# Patient Record
Sex: MALE | Race: WHITE | NOT HISPANIC OR LATINO | Employment: FULL TIME | URBAN - METROPOLITAN AREA
[De-identification: names, ages, dates, MRNs, and addresses within clinical notes are randomized per-mention and may not be internally consistent; named-entity substitution may affect disease eponyms.]

---

## 2018-10-11 RX ORDER — LOSARTAN POTASSIUM AND HYDROCHLOROTHIAZIDE 12.5; 1 MG/1; MG/1
1 TABLET ORAL DAILY
COMMUNITY

## 2018-10-11 RX ORDER — ASPIRIN 81 MG/1
81 TABLET ORAL DAILY
COMMUNITY

## 2018-10-11 RX ORDER — MULTIVITAMIN
1 CAPSULE ORAL DAILY
COMMUNITY

## 2018-10-11 RX ORDER — MELATONIN
1000 DAILY
COMMUNITY

## 2018-10-14 ENCOUNTER — ANESTHESIA EVENT (OUTPATIENT)
Dept: GASTROENTEROLOGY | Facility: HOSPITAL | Age: 63
End: 2018-10-14
Payer: COMMERCIAL

## 2018-10-15 ENCOUNTER — ANESTHESIA (OUTPATIENT)
Dept: GASTROENTEROLOGY | Facility: HOSPITAL | Age: 63
End: 2018-10-15
Payer: COMMERCIAL

## 2018-10-15 ENCOUNTER — HOSPITAL ENCOUNTER (OUTPATIENT)
Facility: HOSPITAL | Age: 63
Setting detail: OUTPATIENT SURGERY
Discharge: HOME | End: 2018-10-15
Attending: COLON & RECTAL SURGERY | Admitting: COLON & RECTAL SURGERY
Payer: COMMERCIAL

## 2018-10-15 VITALS
SYSTOLIC BLOOD PRESSURE: 116 MMHG | RESPIRATION RATE: 20 BRPM | HEART RATE: 66 BPM | BODY MASS INDEX: 41.68 KG/M2 | OXYGEN SATURATION: 93 % | WEIGHT: 275 LBS | TEMPERATURE: 99.3 F | HEIGHT: 68 IN | DIASTOLIC BLOOD PRESSURE: 57 MMHG

## 2018-10-15 DIAGNOSIS — Z12.11 ENCOUNTER FOR SCREENING FOR MALIGNANT NEOPLASM OF COLON: ICD-10-CM

## 2018-10-15 DIAGNOSIS — Z86.010 HISTORY OF COLONIC POLYPS: ICD-10-CM

## 2018-10-15 PROCEDURE — 88305 TISSUE EXAM BY PATHOLOGIST: CPT | Performed by: PATHOLOGY

## 2018-10-15 RX ORDER — SODIUM CHLORIDE 9 MG/ML
125 INJECTION, SOLUTION INTRAVENOUS CONTINUOUS
Status: DISCONTINUED | OUTPATIENT
Start: 2018-10-15 | End: 2018-10-15 | Stop reason: HOSPADM

## 2018-10-15 RX ORDER — PROPOFOL 10 MG/ML
INJECTION, EMULSION INTRAVENOUS AS NEEDED
Status: DISCONTINUED | OUTPATIENT
Start: 2018-10-15 | End: 2018-10-15 | Stop reason: SURG

## 2018-10-15 RX ORDER — ONDANSETRON 2 MG/ML
4 INJECTION INTRAMUSCULAR; INTRAVENOUS EVERY 6 HOURS PRN
Status: DISCONTINUED | OUTPATIENT
Start: 2018-10-15 | End: 2018-10-15 | Stop reason: HOSPADM

## 2018-10-15 RX ORDER — PROPOFOL 10 MG/ML
INJECTION, EMULSION INTRAVENOUS CONTINUOUS PRN
Status: DISCONTINUED | OUTPATIENT
Start: 2018-10-15 | End: 2018-10-15 | Stop reason: SURG

## 2018-10-15 RX ORDER — NIACIN 100 MG
100 TABLET ORAL
COMMUNITY

## 2018-10-15 RX ORDER — CHLORAL HYDRATE 500 MG
1000 CAPSULE ORAL DAILY
COMMUNITY

## 2018-10-15 RX ORDER — RIBOFLAVIN (VITAMIN B2) 100 MG
100 TABLET ORAL DAILY
COMMUNITY

## 2018-10-15 RX ADMIN — SODIUM CHLORIDE 125 ML/HR: 0.9 INJECTION, SOLUTION INTRAVENOUS at 09:28

## 2018-10-15 RX ADMIN — PROPOFOL 120 MCG/KG/MIN: 10 INJECTION, EMULSION INTRAVENOUS at 10:01

## 2018-10-15 RX ADMIN — PROPOFOL 100 MG: 10 INJECTION, EMULSION INTRAVENOUS at 10:00

## 2018-10-15 NOTE — DISCHARGE INSTRUCTIONS
[facility] [address], [address] [place] [address] [place]  Phone: [phone]    DISCHARGE INSTRUCTIONS:    1   ___ Complete Exam - Normal    2   ___ Exam normal, but entire colon not seen  We will discuss this with you  3   ___ Polyp(s) removed by "burning" - NO pathology report will follow    4   _2__ Polyp(s) removed by excision  Pathology report will be available in 4-5 days   Someone from our office will call you with results  5   ___ Exam prompted biopsies  Pathology report will be available in 4-5 days   Someone from our office will call you with results  6   ___ Exam demonstrated findings that need treatment  Prescriptions will be   Given to you  Return to our office in ____ weeks  Please call for appt  7   ___ Original office visit or colonoscopy findings necessitate an office visit  Please call to set up a new appointment    8   ___ Medication  __________________________________________        [address]:    - Go straight home and rest today    - No driving or drinking alcohol for 24 hours    - Resume regular diet and medications unless otherwise instructed  Coumadin and Plavix are blood thinners  You can resume these medications on __________  IF YOU ARE HAVING ANY FEVER, BLEEDING OR PERSISTENT PAIN IN THE ABDOMEN, PLEASE CALL OUR OFFICE ANY DAY OR TIME  [phone]        Colorectal Polyps   WHAT YOU NEED TO KNOW:   Colorectal polyps are small growths of tissue in the lining of the colon and rectum  Most polyps are hyperplastic polyps and are usually benign (noncancerous)  Certain types of polyps, called adenomatous polyps, may turn into cancer  DISCHARGE INSTRUCTIONS:   Follow up with your healthcare provider or gastroenterologist as directed: You may need to return for more tests, such as another colonoscopy  Write down your questions so you remember to ask them during your visits    Reduce your risk for colorectal polyps:   · Eat a variety of healthy foods:  Healthy foods include fruit, vegetables, whole-grain breads, low-fat dairy products, beans, lean meat, and fish  Ask if you need to be on a special diet  · Maintain a healthy weight:  Ask your healthcare provider if you need to lose weight and how much you need to lose  Ask for help with a weight loss program     · Exercise:  Begin to exercise slowly and do more as you get stronger  Talk with your healthcare provider before you start an exercise program      · Limit alcohol:  Your risk for polyps increases the more you drink  · Do not smoke: If you smoke, it is never too late to quit  Ask for information about how to stop  For support and more information:   · Nasir Gar ([facility])  [address] [address]  Phone: [phone]  Web Address: www digestive  niddk nih gov  Contact your healthcare provider or gastroenterologist if:   · You have a fever  · You have chills, a cough, or feel weak and achy  · You have abdominal pain that does not go away or gets worse after you take medicine  · Your abdomen is swollen  · You are losing weight without trying  · You have questions or concerns about your condition or care  Seek care immediately or call 911 if:   · You have sudden shortness of breath  · You have a fast heart rate, fast breathing, or are too dizzy to stand up  · You have severe abdominal pain  · You see blood in your bowel movement  © 2017 [address] [employer] Information is for End User's use only and may not be sold, redistributed or otherwise used for commercial purposes  All illustrations and images included in CareNotes® are the copyrighted property of [employer]  or [employer]rient[employer]  The above information is an  only  It is not intended as medical advice for individual conditions or treatments   Talk to your doctor, nurse or pharmacist before following any medical regimen to see if it is safe and effective for you  Colonoscopy   WHAT YOU NEED TO KNOW:   A colonoscopy is a procedure to examine the inside of your colon (intestine) with a scope  Polyps or tissue growths may have been removed during your colonoscopy  It is normal to feel bloated and to have some abdominal discomfort  You should be passing gas  If you have hemorrhoids or you had polyps removed, you may have a small amount of bleeding  DISCHARGE INSTRUCTIONS:   Seek care immediately if:   · You have a large amount of bright red blood in your bowel movements  · Your abdomen is hard and firm and you have severe pain  · You have sudden trouble breathing  Contact your healthcare provider if:   · You develop a rash or hives  · You have a fever within 24 hours of your procedure  · You have not had a bowel movement for 3 days after your procedure  · You have questions or concerns about your condition or care  Activity:   · Do not lift, strain, or run  for 3 days after your procedure  · Rest after your procedure  You have been given medicine to relax you  Do not  drive or make important decisions until the day after your procedure  Return to your normal activity as directed  · Relieve gas and discomfort from bloating  by lying on your right side with a heating pad on your abdomen  You may need to take short walks to help the gas move out  Eat small meals until bloating is relieved  If you had polyps removed: For 7 days after your procedure:  · Do not  take aspirin  · Do not  go on long car rides  Help prevent constipation:   · Eat a variety of healthy foods  Healthy foods include fruit, vegetables, whole-grain breads, low-fat dairy products, beans, lean meat, and fish  Ask if you need to be on a special diet  Your healthcare provider may recommend that you eat high-fiber foods such as cooked beans   Fiber helps you have regular bowel movements  · Drink liquids as directed  Adults should drink between 9 and 13 eight-ounce cups of liquid every day  Ask what amount is best for you  For most people, good liquids to drink are water, juice, and milk  · Exercise as directed  Talk to your healthcare provider about the best exercise plan for you  Exercise can help prevent constipation, decrease your blood pressure and improve your health  Follow up with your healthcare provider as directed:  Write down your questions so you remember to ask them during your visits  © 2017 [address] [facility] Information is for End User's use only and may not be sold, redistributed or otherwise used for commercial purposes  All illustrations and images included in CareNotes® are the copyrighted property of A D A M [employer]  or Darrick Barrientos  The above information is an  only  It is not intended as medical advice for individual conditions or treatments  Talk to your doctor, nurse or pharmacist before following any medical regimen to see if it is safe and effective for you

## 2018-10-15 NOTE — OP NOTE
OPERATIVE REPORT  PATIENT NAME: Armen Lorenzo    :  1955  MRN: [id number]  Pt Location: [facility] 01    SURGERY DATE: 10/15/2018    Indications:  Previous history of colon polyps  Procedure:  Colonoscopy to cecum with snare polypectomies  Findings:  Colon polyps  Anesthesia Type: IV Sedation with Anesthesia    Complications: None      Procedure: The patient was in the left lateral position  Sedation was administered by anesthesia  Rectal exam was unremarkable  The scope was introduced and passed to the cecum  The bowel preparation was only fair  Distal to the cecum was a 1 cm polyp excised with hot snare  Scope was removed  The descending colon was another 5 mm polyp excised by cold snare  The scope was removed  Impression:  Status post polypectomies  Plan:  Follow up histologies  Repeat colonoscopy in 3 years  Specimen(s):  ID Type Source Tests Collected by Time Destination   1 : polyp Tissue Large Intestine, Right/Ascending Colon TISSUE EXAM Jim Hughes MD 10/15/2018 1021          Attestation: I was present for the entire procedure        Patient Disposition: PACU      SIGNATURE: Jim Hughes MD  DATE: October 15, 2018  TIME: 10:27 AM

## 2018-10-15 NOTE — DISCHARGE SUMMARY
[facility]                                                                                 DISCHARGE SUMMARY        PATIENT NAME: Maxine New    :  1955  MRN: [id number]  Pt Location: [facility]    DISCHARGE DATE: 10/15/2018    PROCEDURE: Procedure(s) (LRB):  COLONOSCOPY with polypectomy (N/A)    Anesthesia Type: IV Sedation with Anesthesia    Complications: None    Specimen(s):  ID Type Source Tests Collected by Time Destination   1 : polyp Tissue Large Intestine, Right/Ascending Colon TISSUE Tameka Gordon MD 10/15/2018 1021    2 : polyp Tissue Large Intestine, Left/Descending Colon TISSUE EXAM Halima Burgess MD 10/15/2018 [facility] COURSE: The patient was admitted for elective procedure  The procedure was uncomplicated and the the patient was discharged home post procedure          SIGNATURE: Halima Burgess MD  DATE: October 15, 2018  TIME: 10:28 AM

## 2018-10-15 NOTE — ANESTHESIA PREPROCEDURE EVALUATION
Review of Systems/Medical History  Patient summary reviewed  Chart reviewed  No history of anesthetic complications     Cardiovascular  Hypertension controlled,    Pulmonary  Smoker ex-smoker  , Sleep apnea CPAP,        GI/Hepatic    Bowel prep  Comment: HCP     Prostatic disorder, history of prostate cancer       Endo/Other    Obesity  morbid obesity   GYN  Negative gynecology ROS          Hematology  Negative hematology ROS      Musculoskeletal  Negative musculoskeletal ROS        Neurology  Negative neurology ROS      Psychology   Negative psychology ROS              Physical Exam    Airway    Mallampati score: III  TM Distance: >3 FB  Neck ROM: full     Dental   No notable dental hx     Cardiovascular  Rhythm: regular, Rate: normal, Cardiovascular exam normal    Pulmonary  Pulmonary exam normal Breath sounds clear to auscultation,     Other Findings        Anesthesia Plan  ASA Score- 3     Anesthesia Type- IV sedation with anesthesia with ASA Monitors  Additional Monitors:   Airway Plan:         Plan Factors-Patient not instructed to abstain from smoking on day of procedure       Induction- intravenous  Postoperative Plan-     Informed Consent- Anesthetic plan and risks discussed with patient and spouse  I personally reviewed this patient with the CRNA  Discussed and agreed on the Anesthesia Plan with the CRNA  Jessica Carmona

## 2019-09-13 ENCOUNTER — ANESTHESIA EVENT (OUTPATIENT)
Dept: GASTROENTEROLOGY | Facility: HOSPITAL | Age: 64
End: 2019-09-13

## 2019-09-13 RX ORDER — HYDROCORTISONE ACETATE 25 MG/1
25 SUPPOSITORY RECTAL
COMMUNITY

## 2019-09-13 RX ORDER — MESALAMINE 1000 MG/1
1000 SUPPOSITORY RECTAL
COMMUNITY

## 2019-09-16 ENCOUNTER — ANESTHESIA (OUTPATIENT)
Dept: GASTROENTEROLOGY | Facility: HOSPITAL | Age: 64
End: 2019-09-16

## 2019-09-16 ENCOUNTER — HOSPITAL ENCOUNTER (OUTPATIENT)
Dept: GASTROENTEROLOGY | Facility: HOSPITAL | Age: 64
Setting detail: OUTPATIENT SURGERY
Discharge: HOME | End: 2019-09-16
Attending: COLON & RECTAL SURGERY | Admitting: COLON & RECTAL SURGERY
Payer: COMMERCIAL

## 2019-09-16 VITALS
SYSTOLIC BLOOD PRESSURE: 101 MMHG | HEIGHT: 68 IN | BODY MASS INDEX: 41.68 KG/M2 | HEART RATE: 54 BPM | TEMPERATURE: 98.6 F | OXYGEN SATURATION: 96 % | WEIGHT: 275 LBS | DIASTOLIC BLOOD PRESSURE: 54 MMHG | RESPIRATION RATE: 16 BRPM

## 2019-09-16 DIAGNOSIS — K62.5 HEMORRHAGE OF ANUS AND RECTUM: ICD-10-CM

## 2019-09-16 DIAGNOSIS — K50.10 CROHN'S DISEASE OF LARGE INTESTINE WITHOUT COMPLICATION (HCC): ICD-10-CM

## 2019-09-16 PROCEDURE — 88305 TISSUE EXAM BY PATHOLOGIST: CPT | Performed by: PATHOLOGY

## 2019-09-16 RX ORDER — MESALAMINE 4 G/60ML
4 ENEMA RECTAL
Qty: 1800 ML | Refills: 0 | Status: SHIPPED | OUTPATIENT
Start: 2019-09-16 | End: 2019-10-16

## 2019-09-16 RX ORDER — PROPOFOL 10 MG/ML
INJECTION, EMULSION INTRAVENOUS AS NEEDED
Status: DISCONTINUED | OUTPATIENT
Start: 2019-09-16 | End: 2019-09-16 | Stop reason: SURG

## 2019-09-16 RX ORDER — PREDNISONE 10 MG/1
40 TABLET ORAL DAILY
Qty: 120 TABLET | Refills: 0 | Status: SHIPPED | OUTPATIENT
Start: 2019-09-16 | End: 2019-10-16

## 2019-09-16 RX ORDER — SODIUM CHLORIDE 9 MG/ML
125 INJECTION, SOLUTION INTRAVENOUS CONTINUOUS
Status: DISCONTINUED | OUTPATIENT
Start: 2019-09-16 | End: 2019-09-20 | Stop reason: HOSPADM

## 2019-09-16 RX ORDER — ONDANSETRON 2 MG/ML
4 INJECTION INTRAMUSCULAR; INTRAVENOUS EVERY 6 HOURS PRN
Status: DISCONTINUED | OUTPATIENT
Start: 2019-09-16 | End: 2019-09-20 | Stop reason: HOSPADM

## 2019-09-16 RX ADMIN — PROPOFOL 20 MG: 10 INJECTION, EMULSION INTRAVENOUS at 08:15

## 2019-09-16 RX ADMIN — PROPOFOL 30 MG: 10 INJECTION, EMULSION INTRAVENOUS at 08:09

## 2019-09-16 RX ADMIN — PROPOFOL 100 MG: 10 INJECTION, EMULSION INTRAVENOUS at 08:01

## 2019-09-16 RX ADMIN — PROPOFOL 20 MG: 10 INJECTION, EMULSION INTRAVENOUS at 08:13

## 2019-09-16 RX ADMIN — PROPOFOL 30 MG: 10 INJECTION, EMULSION INTRAVENOUS at 08:07

## 2019-09-16 RX ADMIN — PROPOFOL 40 MG: 10 INJECTION, EMULSION INTRAVENOUS at 08:03

## 2019-09-16 RX ADMIN — SODIUM CHLORIDE 125 ML/HR: 0.9 INJECTION, SOLUTION INTRAVENOUS at 07:49

## 2019-09-16 RX ADMIN — LIDOCAINE HYDROCHLORIDE 20 MG: 20 INJECTION, SOLUTION INTRAVENOUS at 08:01

## 2019-09-16 RX ADMIN — PROPOFOL 20 MG: 10 INJECTION, EMULSION INTRAVENOUS at 08:11

## 2019-09-16 RX ADMIN — PROPOFOL 30 MG: 10 INJECTION, EMULSION INTRAVENOUS at 08:05

## 2019-09-16 NOTE — ANESTHESIA PREPROCEDURE EVALUATION
Review of Systems/Medical History  Patient summary reviewed  Chart reviewed      Cardiovascular  Hypertension controlled,    Pulmonary  Sleep apnea CPAP,        GI/Hepatic      Comment: Ulcerative proctitis  HCP     Prostatic disorder, history of prostate cancer       Endo/Other    Obesity  morbid obesity   GYN       Hematology  Negative hematology ROS      Musculoskeletal  Negative musculoskeletal ROS        Neurology  Negative neurology ROS      Psychology   Negative psychology ROS              Physical Exam    Airway    Mallampati score: III  TM Distance: >3 FB  Neck ROM: full     Dental   No notable dental hx     Cardiovascular  Rhythm: regular, Rate: normal, Cardiovascular exam normal    Pulmonary  Pulmonary exam normal Breath sounds clear to auscultation,     Other Findings        Anesthesia Plan  ASA Score- 3     Anesthesia Type- IV sedation with anesthesia with ASA Monitors  Additional Monitors:   Airway Plan:         Plan Factors-Patient not instructed to abstain from smoking on day of procedure  Patient did not smoke on day of surgery  Induction- intravenous  Postoperative Plan-     Informed Consent- Anesthetic plan and risks discussed with patient  I personally reviewed this patient with the CRNA  Discussed and agreed on the Anesthesia Plan with the CRNA  Marianne Cohn

## 2019-09-16 NOTE — INTERVAL H&P NOTE
H&P reviewed  After examining the patient I find no changes in the patients condition since the H&P had been written      Vitals:    09/16/19 0737   BP: 140/66   Pulse: 61   Resp: 17   Temp: 98 6 °F (37 °C)   SpO2: 97%

## 2019-09-16 NOTE — ANESTHESIA POSTPROCEDURE EVALUATION
Post-Op Assessment Note    CV Status:  Stable    Pain management: adequate     Mental Status:  Alert and awake   Hydration Status:  Euvolemic   PONV Controlled:  Controlled   Airway Patency:  Patent   Post Op Vitals Reviewed: Yes      Staff: Anesthesiologist, CRNA           /51 (09/16/19 0822)    Temp      Pulse (!) 54 (09/16/19 0822)   Resp 16 (09/16/19 0822)    SpO2 97 % (09/16/19 6258)

## 2019-09-16 NOTE — DISCHARGE INSTRUCTIONS
[facility] [address], [address] [place] [address] [address]  Phone: [phone]    DISCHARGE INSTRUCTIONS:    1   _x__ Complete Exam - proctitis    2   ___ Exam normal, but entire colon not seen  We will discuss this with you  3   ___ Polyp(s) removed by "burning" - NO pathology report will follow    4   _1__ Polyp(s) removed by excision  Pathology report will be available in 4-5 days   Someone from our office will call you with results  5   __x_ Exam prompted biopsies  Pathology report will be available in 4-5 days   Someone from our office will call you with results  6   ___ Exam demonstrated findings that need treatment  Prescriptions will be   Given to you  Return to our office in ____ weeks  Please call for appt  7   ___ Original office visit or colonoscopy findings necessitate an office visit  Please call to set up a new appointment    8   ___ Medication  __________________________________________        [address]:    - Go straight home and rest today    - No driving or drinking alcohol for 24 hours    - Resume regular diet and medications unless otherwise instructed  Coumadin and Plavix are blood thinners  You can resume these medications on __________  IF YOU ARE HAVING ANY FEVER, BLEEDING OR PERSISTENT PAIN IN THE ABDOMEN, PLEASE CALL OUR OFFICE ANY DAY OR TIME  [phone]                          Colonoscopy   WHAT YOU NEED TO KNOW:   A colonoscopy is a procedure to examine the inside of your colon (intestine) with a scope  Polyps or tissue growths may have been removed during your colonoscopy  It is normal to feel bloated and to have some abdominal discomfort  You should be passing gas  If you have hemorrhoids or you had polyps removed, you may have a small amount of bleeding  DISCHARGE INSTRUCTIONS:   Seek care immediately if:   · You have a large amount of bright red blood in your bowel movements      · Your abdomen is hard and firm and you have severe pain  · You have sudden trouble breathing  Contact your healthcare provider if:   · You develop a rash or hives  · You have a fever within 24 hours of your procedure  · You have not had a bowel movement for 3 days after your procedure  · You have questions or concerns about your condition or care  Activity:   · Do not lift, strain, or run  for 3 days after your procedure  · Rest after your procedure  You have been given medicine to relax you  Do not  drive or make important decisions until the day after your procedure  Return to your normal activity as directed  · Relieve gas and discomfort from bloating  by lying on your right side with a heating pad on your abdomen  You may need to take short walks to help the gas move out  Eat small meals until bloating is relieved  If you had polyps removed: For 7 days after your procedure:  · Do not  take aspirin  · Do not  go on long car rides  Help prevent constipation:   · Eat a variety of healthy foods  Healthy foods include fruit, vegetables, whole-grain breads, low-fat dairy products, beans, lean meat, and fish  Ask if you need to be on a special diet  Your healthcare provider may recommend that you eat high-fiber foods such as cooked beans  Fiber helps you have regular bowel movements  · Drink liquids as directed  Adults should drink between 9 and 13 eight-ounce cups of liquid every day  Ask what amount is best for you  For most people, good liquids to drink are water, juice, and milk  · Exercise as directed  Talk to your healthcare provider about the best exercise plan for you  Exercise can help prevent constipation, decrease your blood pressure and improve your health  Follow up with your healthcare provider as directed:  Write down your questions so you remember to ask them during your visits     © 2017 Josefina[employer] Bashir Titus Information is for End User's use only and may not be sold, redistributed or otherwise used for commercial purposes  All illustrations and images included in CareNotes® are the copyrighted property of [employer]  or Darrick Barrientos  The above information is an  only  It is not intended as medical advice for individual conditions or treatments  Talk to your doctor, nurse or pharmacist before following any medical regimen to see if it is safe and effective for you  Proctitis   WHAT YOU NEED TO KNOW:   What is proctitis? Proctitis is a condition where you have inflammation of the lining of your rectum  The rectum is the last part of your large intestine that ends at your anus  If the inflammation continues into your colon, it is called proctolitis  Proctitis may be a short-term or long-term condition  What causes proctitis? · Intestinal diseases:  Ulcerative colitis and Crohn disease are autoimmune conditions that may cause proctitis  · Infections:  Intestinal infections or sexually transmitted infections (STIs) may cause proctitis  · Medicines: These include NSAIDs, antibiotics, chemotherapy, and medicine used to clear out your bowel  · Trauma: This can occur from an injury or from putting objects in your rectum  · Radiation treatment:  Radiation to your pelvic area may damage your rectal tissues and blood vessels, causing proctitis  It may occur months or years after your treatment  · Food allergies: These may easily upset any part of your bowel and cause proctitis and proctocolitis  What are the signs and symptoms of proctitis?    · Fever, diarrhea, or constipation    · Abdominal pain and bloating    · Swollen groin and rectal lymph nodes    · Pus, mucus, or blood that comes from your rectum or is in your bowel movement    · Rectal and anal itching, pain, or redness    · Feeling that your rectum is not empty after you have a bowel movement or straining to move your bowels    · Not being able to control your bowel movements  How is proctitis diagnosed? Your healthcare provider will examine you and check your abdomen and groin  He may ask about other health conditions, including your past travels or activities  This also includes exposures and contacts, diseases, or treatments you may have had  Your healthcare provider may also check your rectum by inserting a gloved finger into your anus  You may need any of the following tests:  · Lab tests: These may be done to find if your proctitis is caused by bacteria or allergies  A sample of your blood, stool, or discharge may be taken  · Allergy testing: This may be done if your healthcare provider thinks your proctitis is caused by a food allergy  He may do a skin test or have you not eat certain foods  Ask your healthcare provider for more information about allergy testing  · Anoscopy:  During this test, a short tube is carefully put into your anus and up the rectum  This lets healthcare providers look inside your anus and rectum  · Endoscopy:  A long, thin tube with a small camera on the end is put into your anus  Healthcare providers will look for problems in your rectum and colon  A small amount of tissue may be taken from your bowel and sent for tests  How is proctitis treated? Treatment will depend on the cause of the proctitis  Mild proctitis caused by radiation may resolve without treatment  In other cases, treatment may include any of the following:  · Antibiotics: This medicine is given if a bacterial infection is causing your proctitis  Take them as directed  · Antiviral medicine: This medicine is given if a viral infection is causing your proctitis  · Antiinflammatory medicine: This medicine helps prevent swelling  · Antiulcer medicine: This is given as a pill, suppository, or enema to coat the bowel and help prevent further damage to the tissues  It may also help with tissue healing  · Steroids:  Steroid medicine helps decrease swelling       · Surgery: Surgery may be needed if other treatments have failed  Surgery to remove the damaged part of your bowel may be done  What else may be used to treat proctitis? If you are bleeding, your healthcare provider may recommend the following:  · Formalin:  This is a chemical solution applied to the walls of your rectum to decrease bleeding  This treatment may be used for radiation proctitis  · Heat therapy: This treatment uses heat to control bleeding and diarrhea caused by radiation  Heat therapy includes laser therapy or argon plasma coagulation (APC)  Ask your healthcare provider for more information on heat therapy  · Hyperbaric oxygen therapy (HBOT):  This is used to increase the oxygen in your body  HBOT may help promote healing of tissues damaged by radiation  What are the risks of proctitis? Some tests or procedures done inside your bowels may cause a perforation (tear) and narrowing  If not treated, proctitis may cause more bleeding or ulcers and scars to form  Scar tissue may lead to narrowing of your rectum  Germs causing your infection may enter your tissues and cause an abscess (collection of pus)  A fistula (abnormal connection) may form from your anus or rectum to your skin or another organ  If you are a woman, a fistula may connect your rectum to your vagina  How can I help treat or prevent proctitis? · Ask about medicines to help ease your symptoms:  If you have constipation, ask about fiber supplements  If you have trouble controlling your bowel, ask about stool-forming medicines  Ask your healthcare provider about a good skin care product to use if the skin around your anus is irritated  · Ask about medicines to help prevent proctitis:  If you are having radiation, these medicines may help prevent you from having proctitis after your therapy  · Practice safe sex:  Do not have sex with someone who has an STI  This includes oral or anal sex   Do not have sex while you or your partner is being treated for a STI  Use new a latex condom or proper barrier each time you have sex  · Get regular check-ups:  Have a regular sexual health check if you often change sexual partners  · Wash your hands often:  Use soap and water  Use gel hand cleanser when there is no soap and water available  This will prevent the spread of germs  Always wash your hands after you use the toilet, when you work with food, and before and after you have sex  Clean your toilet seats, water taps, and door handles often  When should I contact my healthcare provider? · You have bleeding or pain during or after sex  · You have signs and symptoms that are new, do not improve, or get worse  · You have questions or concerns about your condition or care  When should I seek immediate care or call 911? · You have severe abdominal or rectal pain that does not go away  · You have blood, pus, or a foul-smelling discharge coming from your anus or rectum  · You have joint pain, swollen lymph nodes, or night sweats  · You have genital swelling or pain or unusual bleeding  · Your stools are black or have blood on them  CARE AGREEMENT:   You have the right to help plan your care  Learn about your health condition and how it may be treated  Discuss treatment options with your caregivers to decide what care you want to receive  You always have the right to refuse treatment  The above information is an  only  It is not intended as medical advice for individual conditions or treatments  Talk to your doctor, nurse or pharmacist before following any medical regimen to see if it is safe and effective for you  © 2017 [address] Information is for End User's use only and may not be sold, redistributed or otherwise used for commercial purposes  All illustrations and images included in CareNotes® are the copyrighted property of [employer]  or Darrick Floyd        Colorectal Polyps WHAT YOU NEED TO KNOW:   What are colorectal polyps? Colorectal polyps are small growths of tissue in the lining of the colon and rectum  Most polyps are hyperplastic polyps and are usually benign (noncancerous)  Certain types of polyps, called adenomatous polyps, may turn into cancer  What increases my risk of colorectal polyps? The exact cause of colorectal polyps is unknown  The following may increase your risk:  · Older age    · A diet of foods high in fat and low in fiber     · Family history of polyps    · Intestinal diseases, such as Crohn's disease or ulcerative colitis    · An unhealthy lifestyle, such as physical inactivity, smoking, or drinking alcohol    · Obesity  What are the signs and symptoms of colorectal polyps? · Blood in your bowel movement or bleeding from the rectum    · Change in bowel movement habits, such as diarrhea and constipation    · Abdominal pain  How are colorectal polyps diagnosed? You should have fecal blood screening once a year for colorectal disease if you are over 48years old  You should be screened earlier if you have an intestinal disease or a family history of polyps or colorectal cancer  During this screening, a sample of your bowel movement is checked for blood, which may be an early sign of colorectal polyps or cancer  You may also need any of the following tests:  · Digital rectal exam:  Your healthcare provider will examine your anus and use a finger to check your rectum for polyps  · Barium enema: A barium enema is an x-ray of the colon  A tube is put into your anus, and a liquid called barium is put through the tube  Barium is used so that caregivers can see your colon better on the x-ray film  · Virtual colonoscopy: This is a CT scan that takes pictures of the inside of your colon and rectum  A small, flexible tube is put into your rectum and air or carbon dioxide (gas) is used to expand your colon   This lets healthcare providers clearly see your colon and any polyps on a monitor  · Colonoscopy or sigmoidoscopy: These procedures help your healthcare provider see the inside of your colon using a flexible tube with a small light and camera on the end  During a sigmoidoscopy, your healthcare provider will only look at rectum and lower colon  During a colonoscopy, healthcare providers will look at the full length of your colon  Healthcare providers may remove a small amount of tissue from the colon for a biopsy  How are colorectal polyps treated? · Polyp removal:  Polyps may be removed during your sigmoidoscopy or colonoscopy  · Polypectomy: This is surgery to remove your polyps  You may need laparoscopic or open surgery, depending on the type, size, and number of polyps that you have  Laparoscopy is done by inserting a small, flexible scope into incisions made on your abdomen  Open surgery is done by making a larger incision on your abdomen   What are the risks of colorectal polyps? You may bleed during a colonoscopy procedure  Your bowel may be perforated (torn) when polyps are removed  This may lead to an open abdominal surgery  During surgery, you may bleed too much or get an infection  Adenomatous polyps that are not removed may turn into cancer and become more difficult to treat  Where can I find support and more information? · Nasir Gar ([facility])  [address] [address]  Phone: [phone]  Web Address: Priyanka [employer]  When should I contact my healthcare provider? · You have a fever  · You have chills, a cough, or feel weak and achy  · You have abdominal pain that does not go away or gets worse after you take medicine  · Your abdomen is swollen  · You are losing weight without trying  · You have questions or concerns about your condition or care  When should I seek immediate care or call 911?    · You have sudden shortness of breath  · You have a fast heart rate, fast breathing, or are too dizzy to stand up  · You have severe abdominal pain  · You see blood in your bowel movement  CARE AGREEMENT:   You have the right to help plan your care  Learn about your health condition and how it may be treated  Discuss treatment options with your caregivers to decide what care you want to receive  You always have the right to refuse treatment  The above information is an  only  It is not intended as medical advice for individual conditions or treatments  Talk to your doctor, nurse or pharmacist before following any medical regimen to see if it is safe and effective for you  © 2017 [address] Information is for End User's use only and may not be sold, redistributed or otherwise used for commercial purposes  All illustrations and images included in CareNotes® are the copyrighted property of [employer]  or [employer]  Mesalamine (Into the rectum)   Mesalamine (me-SAL-a-meen)  Treats ulcerative proctitis  Brand Name(s): Canasa, Rowasa, sfRowasa   There may be other brand names for this medicine  When This Medicine Should Not Be Used: This medicine is not right for everyone  Do not use it if you had an allergic reaction to mesalamine or salicylates (such as aspirin)  Do not use the rectal suppository if you are allergic to saturated vegetable fat, such as shortening  How to Use This Medicine:   Enema, Suppository  · Your doctor will tell you how much medicine to use  Do not use more than directed  · Enema:   ¨ Use the enema at bedtime, unless your doctor tells you otherwise  Shake the bottle and then remove the cap  ¨ Wash your hands with soap and water before and after using this medicine  Mely Brown on your left side with your left leg straight or slightly bent, and your right knee bent upward  Insert the applicator tip into your rectum about an inch   Gently squeeze the bottom of the bottle to release the enema  ¨ Remove the bottle and throw it away  ¨ You may need to hold the liquid in your rectum for several minutes or hours  This may be difficult or feel uncomfortable  It should become easier to do as you continue to use the medicine  · Suppository:   ¨ Never take rectal suppositories by mouth  ¨ Use this medicine at bedtime, unless your doctor tells you otherwise  ¨ Before you insert a suppository, go to the bathroom and empty your bladder and, if possible, have a bowel movement  ¨ This medicine will stain any surface it touches (such as clothing, bedsheets, floors, and countertops)  To protect your clothing, consider wearing a sanitary napkin or adult undergarment such as Depend®  Protect your sheets by placing a waterproof pad on your bed  ¨ Wash your hands with soap and water before and after using this medicine  Remove the foil or wrapper from the suppository before inserting it  Do not keep the suppository in your hand too long or it will begin to melt  ¨ To make the suppository easier to insert, you may use a lubricating gel such as K-Y® Jelly, but do not use petroleum jelly (Vaseline®)  Jorge Cera on your left side with your left leg straight or slightly bent, and your right knee bent upward  Gently push the pointed end of the suppository into the rectum about 1 inch  ¨ The medicine needs to remain in your body for 1 to 3 hours or longer  Try not to use the bathroom for at least that length of time after inserting the suppository  · Read and follow the patient instructions that come with this medicine  Talk to your doctor or pharmacist if you have any questions  · Missed dose: Take a dose as soon as you remember  If it is almost time for your next dose, wait until then and take a regular dose  Do not take extra medicine to make up for a missed dose  · You may store the suppositories in the refrigerator, but do not freeze them  Store the enema at room temperature   Keep it away from heat, moisture, or direct light  Drugs and Foods to Avoid:   Ask your doctor or pharmacist before using any other medicine, including over-the-counter medicines, vitamins, and herbal products  · Some medicines can affect how mesalamine works  Tell your doctor if you are using any of the following:  ¨ Azathioprine  ¨ Mercaptopurine  ¨ Mesalamine by mouth  ¨ NSAID pain or arthritis medicine (including aspirin, diclofenac, ibuprofen, naproxen)  Warnings While Using This Medicine:   · Tell your doctor if you are pregnant or breastfeeding, or if you have kidney disease, liver disease, or a history of heart disease (such as myocarditis or pericarditis)  Tell your doctor if you have had an allergic reaction to sulfasalazine  · This medicine may cause the following problems:  ¨ Kidney problems  ¨ Mesalamine-induced acute intolerance syndrome  ¨ Liver failure  · Tell any doctor or dentist who treats you that you are using this medicine  This medicine may affect certain medical test results  · Your doctor will do lab tests at regular visits to check on the effects of this medicine  Keep all appointments  · Keep all medicine out of the reach of children  Never share your medicine with anyone  Possible Side Effects While Using This Medicine:   Call your doctor right away if you notice any of these side effects:  · Allergic reaction: Itching or hives, swelling in your face or hands, swelling or tingling in your mouth or throat, chest tightness, trouble breathing  · Bloody diarrhea, severe cramping, stomach pain, possibly with fever, headache, rash  · Change in how much or how often you urinate  · Chest pain, trouble breathing  · Dark urine or pale stools, nausea, vomiting, loss of appetite, yellow skin or eyes  · Fast, slow, or pounding heartbeat  If you notice other side effects that you think are caused by this medicine, tell your doctor  Call your doctor for medical advice about side effects   You may report side effects to FDA at 5-626-FDA-8104  © 2017 [address] Titus Information is for End User's use only and may not be sold, redistributed or otherwise used for commercial purposes  The above information is an  only  It is not intended as medical advice for individual conditions or treatments  Talk to your doctor, nurse or pharmacist before following any medical regimen to see if it is safe and effective for you  Prednisone (By mouth)   Prednisone (PRED-ni-sone)  Treats many diseases and conditions, especially problems related to inflammation  This medicine is a corticosteroid  Brand Name(s): Contrast Allergy PreMed Pack, Juan, predniSONE Intensol   There may be other brand names for this medicine  When This Medicine Should Not Be Used: This medicine is not right for everyone  Do not use if you had an allergic reaction to prednisone or if you are pregnant  How to Use This Medicine:   Liquid, Tablet, Delayed Release Tablet  · Take your medicine as directed  Your dose may need to be changed several times to find what works best for you  · It is best to take this medicine with food or milk  · Swallow the delayed-release tablet whole  Do not crush, break, or chew it  · Measure the oral liquid medicine with a marked measuring spoon, oral syringe, or medicine cup  · Missed dose: Take a dose as soon as you remember  If it is almost time for your next dose, wait until then and take a regular dose  Do not take extra medicine to make up for a missed dose  · Store the medicine in a closed container at room temperature, away from heat, moisture, and direct light  Do not freeze the oral liquid  Drugs and Foods to Avoid:   Ask your doctor or pharmacist before using any other medicine, including over-the-counter medicines, vitamins, and herbal products    · Tell your doctor if you use any of the following:  ¨ Aminoglutethimide, amphotericin B, carbamazepine, cholestyramine, cyclosporine, digoxin, isoniazid, ketoconazole, phenobarbital, phenytoin, or rifampin  ¨ Blood thinner, such as warfarin  ¨ NSAID pain or arthritis medicine, such as aspirin, diclofenac, ibuprofen, naproxen, celecoxib  ¨ Diuretic (water pill)  ¨ Diabetes medicine  ¨ Macrolide antibiotic, such as azithromycin, clarithromycin, erythromycin  ¨ Estrogen, including birth control pills or hormone replacement therapy  · This medicine may interfere with vaccines  Ask your doctor before you get a flu shot or any other vaccines  Warnings While Using This Medicine:   · It is not safe to take this medicine during pregnancy  It could harm an unborn baby  Tell your doctor right away if you become pregnant  · Tell your doctor if you are breastfeeding or if you have kidney problems, heart failure, high blood pressure, a recent heart attack, diabetes, glaucoma, osteoporosis, or thyroid problems  Tell your doctor about any infection you have  Also tell your doctor if you have had mental or emotional problems (such as depression) or stomach or bowel problems (such as an ulcer or diverticulitis)  · This medicine may cause the following problems:  ¨ Mood or behavior changes  ¨ Higher blood pressure, retaining water, changes in salt or potassium levels in your body  ¨ Cataracts or glaucoma (with long-term use)  ¨ Weak bones or osteoporosis (with long-term use)  ¨ Slow growth in children (with long-term use)  ¨ Muscle problems (with high doses, especially if you have myasthenia gravis or similar nerve and muscle problems)  · Do not stop using this medicine suddenly  Your doctor will need to slowly decrease your dose before you stop it completely  · This medicine could cause you to get infections more easily  Tell your doctor right away if you are exposed to chicken pox, measles, or other serious infection  Tell your doctor if you had a serious infection in the past, such as tuberculosis or herpes    · Tell your doctor about any extra stress or anxiety in your life  Your dose might need to be changed for a short time  · Tell any doctor or dentist who treats you that you are using this medicine  This medicine may affect certain medical test results  · Keep all medicine out of the reach of children  Never share your medicine with anyone  Possible Side Effects While Using This Medicine:   Call your doctor right away if you notice any of these side effects:  · Allergic reaction: Itching or hives, swelling in your face or hands, swelling or tingling in your mouth or throat, chest tightness, trouble breathing  · Dark freckles, skin color changes, coldness, weakness, tiredness, nausea, vomiting, weight loss  · Depression, unusual thoughts, feelings, or behaviors, trouble sleeping  · Fever, chills, cough, sore throat, and body aches  · Muscle pain or weakness  · Rapid weight gain, swelling in your hands, ankles, or feet  · Severe stomach pain, nausea, vomiting, or red or black stools  · Skin changes or growths  · Trouble seeing, eye pain, headache  If you notice these less serious side effects, talk with your doctor:   · Increased appetite  · Round, puffy face  · Weight gain around your neck, upper back, breast, face, or waist  If you notice other side effects that you think are caused by this medicine, tell your doctor  Call your doctor for medical advice about side effects  You may report side effects to FDA at 8-974-FDA-4511  © 2017 [employer] Information is for End User's use only and may not be sold, redistributed or otherwise used for commercial purposes  The above information is an  only  It is not intended as medical advice for individual conditions or treatments  Talk to your doctor, nurse or pharmacist before following any medical regimen to see if it is safe and effective for you

## 2021-03-30 DIAGNOSIS — Z23 ENCOUNTER FOR IMMUNIZATION: ICD-10-CM

## 2022-09-08 RX ORDER — SODIUM CHLORIDE 9 MG/ML
125 INJECTION, SOLUTION INTRAVENOUS CONTINUOUS
Status: CANCELLED | OUTPATIENT
Start: 2022-09-08

## 2022-09-12 ENCOUNTER — ANESTHESIA (OUTPATIENT)
Dept: GASTROENTEROLOGY | Facility: HOSPITAL | Age: 67
End: 2022-09-12

## 2022-09-12 ENCOUNTER — HOSPITAL ENCOUNTER (OUTPATIENT)
Dept: GASTROENTEROLOGY | Facility: HOSPITAL | Age: 67
Setting detail: OUTPATIENT SURGERY
Discharge: HOME | End: 2022-09-12
Attending: COLON & RECTAL SURGERY
Payer: MEDICARE

## 2022-09-12 ENCOUNTER — ANESTHESIA EVENT (OUTPATIENT)
Dept: GASTROENTEROLOGY | Facility: HOSPITAL | Age: 67
End: 2022-09-12

## 2022-09-12 VITALS
TEMPERATURE: 98.8 F | SYSTOLIC BLOOD PRESSURE: 119 MMHG | BODY MASS INDEX: 41.68 KG/M2 | RESPIRATION RATE: 18 BRPM | HEIGHT: 68 IN | OXYGEN SATURATION: 97 % | HEART RATE: 70 BPM | WEIGHT: 275 LBS | DIASTOLIC BLOOD PRESSURE: 66 MMHG

## 2022-09-12 DIAGNOSIS — Z86.010 PERSONAL HISTORY OF COLONIC POLYPS: ICD-10-CM

## 2022-09-12 DIAGNOSIS — Z12.11 ENCOUNTER FOR SCREENING FOR MALIGNANT NEOPLASM OF COLON: ICD-10-CM

## 2022-09-12 DIAGNOSIS — K51.20 ULCERATIVE (CHRONIC) PROCTITIS WITHOUT COMPLICATIONS (HCC): ICD-10-CM

## 2022-09-12 LAB — GLUCOSE SERPL-MCNC: 216 MG/DL (ref 65–140)

## 2022-09-12 PROCEDURE — 82948 REAGENT STRIP/BLOOD GLUCOSE: CPT

## 2022-09-12 PROCEDURE — 88305 TISSUE EXAM BY PATHOLOGIST: CPT | Performed by: STUDENT IN AN ORGANIZED HEALTH CARE EDUCATION/TRAINING PROGRAM

## 2022-09-12 RX ORDER — PROPOFOL 10 MG/ML
INJECTION, EMULSION INTRAVENOUS CONTINUOUS PRN
Status: DISCONTINUED | OUTPATIENT
Start: 2022-09-12 | End: 2022-09-12

## 2022-09-12 RX ORDER — SODIUM CHLORIDE 9 MG/ML
125 INJECTION, SOLUTION INTRAVENOUS CONTINUOUS
Status: DISCONTINUED | OUTPATIENT
Start: 2022-09-12 | End: 2022-09-16 | Stop reason: HOSPADM

## 2022-09-12 RX ORDER — PROPOFOL 10 MG/ML
INJECTION, EMULSION INTRAVENOUS AS NEEDED
Status: DISCONTINUED | OUTPATIENT
Start: 2022-09-12 | End: 2022-09-12

## 2022-09-12 RX ORDER — EPHEDRINE SULFATE 50 MG/ML
INJECTION INTRAVENOUS AS NEEDED
Status: DISCONTINUED | OUTPATIENT
Start: 2022-09-12 | End: 2022-09-12

## 2022-09-12 RX ADMIN — PROPOFOL 80 MG: 10 INJECTION, EMULSION INTRAVENOUS at 08:58

## 2022-09-12 RX ADMIN — EPHEDRINE SULFATE 5 MG: 50 INJECTION, SOLUTION INTRAVENOUS at 09:08

## 2022-09-12 RX ADMIN — SODIUM CHLORIDE 125 ML/HR: 0.9 INJECTION, SOLUTION INTRAVENOUS at 08:37

## 2022-09-12 RX ADMIN — EPHEDRINE SULFATE 10 MG: 50 INJECTION, SOLUTION INTRAVENOUS at 09:13

## 2022-09-12 RX ADMIN — PROPOFOL 100 MCG/KG/MIN: 10 INJECTION, EMULSION INTRAVENOUS at 08:58

## 2022-09-12 RX ADMIN — SODIUM CHLORIDE: 0.9 INJECTION, SOLUTION INTRAVENOUS at 09:18

## 2022-09-12 NOTE — ANESTHESIA PREPROCEDURE EVALUATION
Review of Systems/Medical History  Patient summary reviewed  Chart reviewed  Cardiovascular  Hypertension controlled,    Pulmonary  Sleep apnea CPAP,        GI/Hepatic      Comment: Ulcerative proctitis  HCP     Prostatic disorder, history of prostate cancer       Endo/Other    Obesity  morbid obesity   GYN       Hematology  Negative hematology ROS      Musculoskeletal  Negative musculoskeletal ROS        Neurology  Negative neurology ROS      Psychology   Negative psychology ROS              Physical Exam    Airway    Mallampati score: II  TM Distance: >3 FB  Neck ROM: full     Dental   No notable dental hx     Cardiovascular  Rhythm: regular, Rate: normal, Cardiovascular exam normal    Pulmonary  Pulmonary exam normal Breath sounds clear to auscultation,     Other Findings        Anesthesia Plan  ASA Score- 3     Anesthesia Type- IV sedation with anesthesia with ASA Monitors  Additional Monitors:   Airway Plan:     Comment: GA prn  Plan Factors-    Chart reviewed  Patient summary reviewed  Patient not instructed to abstain from smoking on day of procedure  Patient did not smoke on day of surgery  Induction- intravenous  Postoperative Plan-     Informed Consent- Anesthetic plan and risks discussed with patient and daughter Yanique Walker)  I personally reviewed this patient with the CRNA  Discussed and agreed on the Anesthesia Plan with the CRNA  Eduin Baron

## 2022-09-12 NOTE — DISCHARGE INSTRUCTIONS
[facility] [address], [address] [place] [address] [address]  Phone: [phone]    DISCHARGE INSTRUCTIONS:    1   ___ Complete Exam - Normal    2   ___ Exam normal, but entire colon not seen  We will discuss this with you  3   __1_ Polyp(s) removed by "burning" - NO pathology report will follow    4   ___ Polyp(s) removed by excision  Pathology report will be available in 4-5 days   Someone from our office will call you with results  5   __x_ Exam prompted biopsies  Pathology report will be available in 4-5 days   Someone from our office will call you with results  6   ___ Exam demonstrated findings that need treatment  Prescriptions will be   Given to you  Return to our office in ____ weeks  Please call for appt  7   ___ Original office visit or colonoscopy findings necessitate an office visit  Please call to set up a new appointment    8   ___ Medication  __________________________________________        [address]:    - Go straight home and rest today    - No driving or drinking alcohol for 24 hours    - Resume regular diet and medications unless otherwise instructed  Coumadin and Plavix are blood thinners  You can resume these medications on __________      IF YOU ARE HAVING ANY FEVER, BLEEDING OR PERSISTENT PAIN IN THE ABDOMEN, PLEASE CALL OUR OFFICE ANY DAY OR TIME  [phone]

## 2022-09-12 NOTE — INTERVAL H&P NOTE
H&P reviewed  After examining the patient I find no changes in the patients condition since the H&P had been written      Vitals:    09/12/22 0824   BP: 163/81   Pulse: 75   Resp: 16   Temp: 98 8 °F (37 1 °C)   SpO2: 96%

## 2022-09-19 PROCEDURE — 88305 TISSUE EXAM BY PATHOLOGIST: CPT | Performed by: STUDENT IN AN ORGANIZED HEALTH CARE EDUCATION/TRAINING PROGRAM

## 2025-07-12 ENCOUNTER — OFFICE VISIT (OUTPATIENT)
Dept: URGENT CARE | Facility: CLINIC | Age: 70
End: 2025-07-12
Payer: MEDICARE

## 2025-07-12 VITALS
DIASTOLIC BLOOD PRESSURE: 76 MMHG | BODY MASS INDEX: 34.69 KG/M2 | WEIGHT: 247.8 LBS | HEIGHT: 71 IN | RESPIRATION RATE: 18 BRPM | OXYGEN SATURATION: 98 % | SYSTOLIC BLOOD PRESSURE: 117 MMHG | TEMPERATURE: 98.2 F | HEART RATE: 63 BPM

## 2025-07-12 DIAGNOSIS — L03.116 CELLULITIS OF LEFT LOWER EXTREMITY: Primary | ICD-10-CM

## 2025-07-12 PROCEDURE — G0463 HOSPITAL OUTPT CLINIC VISIT: HCPCS

## 2025-07-12 PROCEDURE — 99202 OFFICE O/P NEW SF 15 MIN: CPT

## 2025-07-12 RX ORDER — ATORVASTATIN CALCIUM 10 MG/1
10 TABLET, FILM COATED ORAL EVERY EVENING
COMMUNITY

## 2025-07-12 RX ORDER — SULFAMETHOXAZOLE AND TRIMETHOPRIM 800; 160 MG/1; MG/1
1 TABLET ORAL EVERY 12 HOURS SCHEDULED
Qty: 20 TABLET | Refills: 0 | Status: SHIPPED | OUTPATIENT
Start: 2025-07-12 | End: 2025-07-22

## 2025-07-12 RX ORDER — AMLODIPINE BESYLATE 5 MG/1
5 TABLET ORAL DAILY
COMMUNITY

## 2025-07-12 NOTE — PROGRESS NOTES
[facility]  Name: Luigi Eldridge      : 1955      MRN: [id number]  Encounter Provider: Shanti Hernandez PA-C  Encounter Date: 2025   Encounter department: [facility] [place]  :  Assessment & Plan  Cellulitis of left lower extremity    Orders:    sulfamethoxazole-trimethoprim (BACTRIM DS) 800-160 mg per tablet; Take 1 tablet by mouth every 12 (twelve) hours for 10 days  Eat yogurt with live and active cultures and/or take a probiotic at least 3 hours before or after antibiotic dose. Monitor stool for diarrhea and/or blood. If this occurs, contact primary care doctor ASAP.     Bactrim as prescribed and if increasing in pain, redness, or swelling then recommendation to be seen prior to the end of antibiotics, continue to elke the skin with the marker each day to ensure that the redness is decreasing   Wash area with soap and water, apply topical antibiotic ointment or petroleum jelly, and change bandage twice per day.  Watch for signs of new or worsening infection.       Patient Instructions  Follow up with PCP in 3-5 days.  Proceed to  ER if symptoms worsen.    If tests are performed, our office will contact you with results only if changes need to made to the care plan discussed with you at the visit. You can review your full results on [facility] MyChart.    Chief Complaint:   Chief Complaint   Patient presents with    Leg Swelling     Left leg swelling with redness and warmth. Patient believes it was a bug bite yesterday      History of Present Illness     Luigi Eldridge is a 70 yr old male with significant PMH of HTN, THUAN, colon poly, ulcerative procitis, prostate cancer, CPAP usage, type two diabetes who presents to the office for concerns of lower leg swelling. He states that he has had swelling in the legs prior and that he has had mosquito bites in the past that have caused cellulitis. He states that he is having some chills and he denies fevers. He says that he is taking  tylenol and his wife says that they are not putting anything on the leg. It is tender on the back, and he admits that the redness is spreading and he states that he is not having fevers.     History obtained from: patient    Review of Systems   Constitutional:  Negative for chills and fever.   HENT:  Negative for ear pain and sore throat.    Eyes:  Negative for pain and visual disturbance.   Respiratory:  Negative for cough and shortness of breath.    Cardiovascular:  Positive for leg swelling. Negative for chest pain and palpitations.   Gastrointestinal:  Negative for abdominal pain and vomiting.   Genitourinary:  Negative for dysuria and hematuria.   Musculoskeletal:  Negative for arthralgias and back pain.   Skin:  Negative for color change and rash.   Neurological:  Negative for seizures and syncope.   All other systems reviewed and are negative.    Past Medical History   Past Medical History[1]  Past Surgical History[2]  Family History[3]  he reports that he has quit smoking. He has never used smokeless tobacco. He reports current alcohol use. He reports that he does not use drugs.  Current Outpatient Medications   Medication Instructions    amLODIPine (NORVASC) 5 mg, Daily    aspirin (ECOTRIN LOW STRENGTH) 81 mg, Daily    atorvastatin (LIPITOR) 10 mg, Every evening    cholecalciferol (VITAMIN D3) 1,000 Units, Daily    Empagliflozin (JARDIANCE) 10 mg, Daily    fish oil 1,000 mg, Daily    hydrocortisone (ANUSOL-HC) 25 mg, Daily at bedtime PRN    losartan-hydrochlorothiazide (HYZAAR) 100-12.5 MG per tablet 1 tablet, Daily    mesalamine (CANASA) 1,000 mg, Daily at bedtime    metFORMIN (GLUCOPHAGE) 1,000 mg, Oral, 2 times daily with meals    Multiple Vitamin (MULTIVITAMIN) capsule 1 capsule, Daily    niacin 100 mg, Daily with breakfast    sulfamethoxazole-trimethoprim (BACTRIM DS) 800-160 mg per tablet 1 tablet, Oral, Every 12 hours scheduled    vitamin C 100 mg, Daily   Allergies[4]     Objective   /76    "Pulse 63   Temp 98.2 °F (36.8 °C) (Temporal)   Resp 18   Ht 5' 11\" (1.803 m)   Wt 112 kg (247 lb 12.8 oz)   SpO2 98%   BMI 34.56 kg/m²      Physical Exam    Skin:     Findings: Burn, erythema and rash present. No abrasion, abscess, acne, bruising, ecchymosis, signs of injury, laceration, lesion, petechiae or wound.      Nails: There is no clubbing.           Comments: Warm to the touch and there is erythema, there is a sunburn and peeling present on the anterior of the shin. There is tenderness across the tib-fib region and there is no active drainage    Pitting edema bilaterally +2         Administrative Statements   I have spent a total time of 20 minutes in caring for this patient on the day of the visit/encounter including Diagnostic results, Prognosis, Risks and benefits of tx options, Instructions for management, Patient and family education, Importance of tx compliance, Risk factor reductions, Counseling / Coordination of care, Documenting in the medical record, Reviewing/placing orders in the medical record (including tests, medications, and/or procedures), Obtaining or reviewing history  , and Communicating with other healthcare professionals .Portions of the record may have been created with voice recognition software.  Occasional wrong word or \"sound a like\" substitutions may have occurred due to the inherent limitations of voice recognition software.  Read the chart carefully and recognize, using context, where substitutions have occurred.         [1]   Past Medical History:  Diagnosis Date    Cancer (HCC)     prostate    Colon polyp     CPAP (continuous positive airway pressure) dependence     Family hx colonic polyps     History of hemorrhoidectomy     Hypertension     Seasonal allergies     Sleep apnea     Ulcerative proctitis (HCC)    [2]   Past Surgical History:  Procedure Laterality Date    COLONOSCOPY      CYSTOSCOPY      bx    JOINT REPLACEMENT  2011    knee-left    LEG SURGERY      " hardware placed and removed    POLYPECTOMY  2018    IL COLONOSCOPY FLX DX W/COLLJ SPEC WHEN PFRMD N/A 10/15/2018    Procedure: COLONOSCOPY with polypectomy;  Surgeon: Gadiel Woodruff MD;  Location: [facility];  Service: Colorectal    PROSTATE SURGERY      PROSTATECTOMY      TONSILLECTOMY     [3] No family history on file.  [4]   Allergies  Allergen Reactions    Lisinopril      cough

## (undated) DEVICE — SINGLE-USE POLYPECTOMY SNARE: Brand: ROTATABLE SNARE